# Patient Record
Sex: FEMALE | Race: WHITE | URBAN - NONMETROPOLITAN AREA
[De-identification: names, ages, dates, MRNs, and addresses within clinical notes are randomized per-mention and may not be internally consistent; named-entity substitution may affect disease eponyms.]

---

## 2020-06-02 ENCOUNTER — OFFICE VISIT (OUTPATIENT)
Dept: URBAN - NONMETROPOLITAN AREA CLINIC 15 | Facility: CLINIC | Age: 70
End: 2020-06-02

## 2020-06-23 ENCOUNTER — OFFICE VISIT (OUTPATIENT)
Dept: URBAN - METROPOLITAN AREA CLINIC 54 | Facility: CLINIC | Age: 70
End: 2020-06-23
Payer: MEDICARE

## 2020-06-23 DIAGNOSIS — D50.9 IDA (IRON DEFICIENCY ANEMIA): ICD-10-CM

## 2020-06-23 DIAGNOSIS — E83.119 HEMOCHROMATOSIS: ICD-10-CM

## 2020-06-23 DIAGNOSIS — E88.81 METABOLIC SYNDROME: ICD-10-CM

## 2020-06-23 DIAGNOSIS — D69.6 THROMBOCYTOPENIA: ICD-10-CM

## 2020-06-23 DIAGNOSIS — E83.10 CIRRHOSIS DUE TO HEMOCHROMATOSIS: ICD-10-CM

## 2020-06-23 DIAGNOSIS — I81 PORTAL VEIN THROMBOSIS: ICD-10-CM

## 2020-06-23 DIAGNOSIS — R74.8 ABNORMAL LIVER ENZYMES: ICD-10-CM

## 2020-06-23 PROCEDURE — G9903 PT SCRN TBCO ID AS NON USER: HCPCS | Performed by: INTERNAL MEDICINE

## 2020-06-23 PROCEDURE — G8427 DOCREV CUR MEDS BY ELIG CLIN: HCPCS | Performed by: INTERNAL MEDICINE

## 2020-06-23 PROCEDURE — 3017F COLORECTAL CA SCREEN DOC REV: CPT | Performed by: INTERNAL MEDICINE

## 2020-06-23 PROCEDURE — G8417 CALC BMI ABV UP PARAM F/U: HCPCS | Performed by: INTERNAL MEDICINE

## 2020-06-23 PROCEDURE — 74170 CT ABD WO CNTRST FLWD CNTRST: CPT | Performed by: INTERNAL MEDICINE

## 2020-06-23 PROCEDURE — 99214 OFFICE O/P EST MOD 30 MIN: CPT | Performed by: INTERNAL MEDICINE

## 2020-06-23 RX ORDER — TOLTERODINE TARTRATE 4 MG/1
TAKE 1 CAPSULE (4 MG) BY ORAL ROUTE ONCE DAILY CAPSULE, EXTENDED RELEASE ORAL 1
Qty: 0 | Refills: 0 | Status: ACTIVE | COMMUNITY
Start: 1900-01-01 | End: 1900-01-01

## 2020-06-23 RX ORDER — INSULIN DETEMIR 100 [IU]/ML
INJECT BY SUBCUTANEOUS ROUTE PER PRESCRIBER'S INSTRUCTIONS. INSULIN DOSING REQUIRES INDIVIDUALIZATION INJECTION, SOLUTION SUBCUTANEOUS
Qty: 1 | Refills: 0 | Status: ACTIVE | COMMUNITY
Start: 1900-01-01 | End: 1900-01-01

## 2020-06-23 RX ORDER — FUROSEMIDE 40 MG/1
1 TABLET TABLET ORAL BID
Qty: 60 | Refills: 2 | OUTPATIENT
Start: 2020-06-23

## 2020-06-23 RX ORDER — SPIRONOLACTONE 25 MG/1
TABLET ORAL
Qty: 0 | Refills: 0 | Status: ACTIVE | COMMUNITY
Start: 1900-01-01 | End: 1900-01-01

## 2020-06-23 RX ORDER — SPIRONOLACTONE 50 MG/1
1 TABLET TABLET, FILM COATED ORAL BID
Qty: 60 | Refills: 2 | OUTPATIENT
Start: 2020-06-23 | End: 2020-09-21

## 2020-06-23 NOTE — PHYSICAL EXAM GASTROINTESTINAL
Abdomen , soft, nontender, nondistended , no guarding or rigidity , no masses palpable , normal bowel sounds , Liver and Spleen , , liver nontender , spleen

## 2020-06-23 NOTE — HPI-TODAY'S VISIT:
Abnormal liver enzymes     790.5/R74.8   Secondary to OTTO now wiht cirrhosis and HFE hemachromatosis screen for varices neg 6/18 hcc monitoring program due dec 2018  2 gm sodium diet  MELD 13 6% 3 ms mortality   Metabolic syndrome     277.7/E88.81  Diabetes     250.00/E11.9  Thrombocytopenia     287.5/D69.6 related to cirrhosis  Cirrhosis     571.5/K74.60 on lactulose still not on rifaximin -MELD 13  pt with chronic portal vein thrombosis  Hemochromatosis     275.03/E83.119 on phlebotomy she is not interested in OLT  Portal vein thrombosis     452/I81 no e/o hcc started on Eliquis monitored by hematology cont current care  6-23-20 Pt returns for f/u visit. Pt found to have low na.  Pt hs been taking gatorade d/t pcp notifying of low sodium. Has not had phlebotomy d/t low BP. Not on eliquis despite PVT.  Pt reports that she sleeps upright. Lower ext edema and itching noted.

## 2020-06-24 LAB
A/G RATIO: 1
AFP, SERUM, TUMOR MARKER: 3.5
ALBUMIN: 3.3
ALKALINE PHOSPHATASE: 104
ALT (SGPT): 15
AST (SGOT): 37
BASO (ABSOLUTE): 0
BASOS: 1
BILIRUBIN, TOTAL: 4.3
BUN/CREATININE RATIO: 12
BUN: 9
CALCIUM: 9.2
CARBON DIOXIDE, TOTAL: 25
CHLORIDE: 99
CREATININE: 0.77
EGFR IF AFRICN AM: 90
EGFR IF NONAFRICN AM: 78
EOS (ABSOLUTE): 0.3
EOS: 6
GLOBULIN, TOTAL: 3.2
GLUCOSE: 204
HEMATOCRIT: 34.3
HEMATOLOGY COMMENTS:: (no result)
HEMOGLOBIN: 12.1
IMMATURE CELLS: (no result)
IMMATURE GRANS (ABS): 0
IMMATURE GRANULOCYTES: 0
INR: 1.2
LYMPHS (ABSOLUTE): 1.1
LYMPHS: 24
MCH: 34.9
MCHC: 35.3
MCV: 99
MONOCYTES(ABSOLUTE): 0.7
MONOCYTES: 16
NEUTROPHILS (ABSOLUTE): 2.4
NEUTROPHILS: 53
NRBC: (no result)
PLATELETS: 95
POTASSIUM: 3.9
PROTEIN, TOTAL: 6.5
PROTHROMBIN TIME: 12.7
RBC: 3.47
RDW: 14.3
SODIUM: 136
WBC: 4.5

## 2020-07-01 ENCOUNTER — TELEPHONE ENCOUNTER (OUTPATIENT)
Dept: URBAN - METROPOLITAN AREA CLINIC 92 | Facility: CLINIC | Age: 70
End: 2020-07-01

## 2020-07-01 ENCOUNTER — CLAIMS CREATED FROM THE CLAIM WINDOW (OUTPATIENT)
Dept: URBAN - METROPOLITAN AREA CLINIC 4 | Facility: CLINIC | Age: 70
End: 2020-07-01
Payer: MEDICARE

## 2020-07-01 ENCOUNTER — OFFICE VISIT (OUTPATIENT)
Dept: URBAN - METROPOLITAN AREA SURGERY CENTER 14 | Facility: SURGERY CENTER | Age: 70
End: 2020-07-01
Payer: MEDICARE

## 2020-07-01 DIAGNOSIS — K29.70 GASTRITIS, UNSPECIFIED, WITHOUT BLEEDING: ICD-10-CM

## 2020-07-01 DIAGNOSIS — K29.60 ADENOPAPILLOMATOSIS GASTRICA: ICD-10-CM

## 2020-07-01 DIAGNOSIS — K25.3 ACUTE BENIGN GASTRIC ULCER: ICD-10-CM

## 2020-07-01 PROCEDURE — 88312 SPECIAL STAINS GROUP 1: CPT | Performed by: PATHOLOGY

## 2020-07-01 PROCEDURE — 43239 EGD BIOPSY SINGLE/MULTIPLE: CPT | Performed by: INTERNAL MEDICINE

## 2020-07-01 PROCEDURE — G8907 PT DOC NO EVENTS ON DISCHARG: HCPCS | Performed by: INTERNAL MEDICINE

## 2020-07-01 PROCEDURE — 88305 TISSUE EXAM BY PATHOLOGIST: CPT | Performed by: PATHOLOGY

## 2020-07-01 RX ORDER — FUROSEMIDE 40 MG/1
1 TABLET TABLET ORAL BID
Qty: 60 | Refills: 2 | Status: ACTIVE | COMMUNITY
Start: 2020-06-23

## 2020-07-01 RX ORDER — PANTOPRAZOLE SODIUM 40 MG/1
1 TABLET TABLET, DELAYED RELEASE ORAL ONCE A DAY
Qty: 30 TABLET | Refills: 6 | OUTPATIENT
Start: 2020-07-01

## 2020-07-01 RX ORDER — SPIRONOLACTONE 25 MG/1
TABLET ORAL
OUTPATIENT
Start: 1900-01-01

## 2020-07-01 RX ORDER — TOLTERODINE TARTRATE 4 MG/1
TAKE 1 CAPSULE (4 MG) BY ORAL ROUTE ONCE DAILY CAPSULE, EXTENDED RELEASE ORAL 1
Qty: 0 | Refills: 0 | Status: ACTIVE | COMMUNITY
Start: 1900-01-01 | End: 1900-01-01

## 2020-07-01 RX ORDER — SPIRONOLACTONE 50 MG/1
1 TABLET TABLET, FILM COATED ORAL BID
Qty: 60 | Refills: 2 | Status: ACTIVE | COMMUNITY
Start: 2020-06-23 | End: 2020-09-21

## 2020-07-01 RX ORDER — INSULIN DETEMIR 100 [IU]/ML
INJECT BY SUBCUTANEOUS ROUTE PER PRESCRIBER'S INSTRUCTIONS. INSULIN DOSING REQUIRES INDIVIDUALIZATION INJECTION, SOLUTION SUBCUTANEOUS
Qty: 1 | Refills: 0 | Status: ACTIVE | COMMUNITY
Start: 1900-01-01 | End: 1900-01-01

## 2020-07-01 RX ORDER — SPIRONOLACTONE 25 MG/1
TABLET ORAL
Qty: 0 | Refills: 0 | Status: ACTIVE | COMMUNITY
Start: 1900-01-01 | End: 1900-01-01

## 2020-07-28 ENCOUNTER — TELEPHONE ENCOUNTER (OUTPATIENT)
Dept: URBAN - METROPOLITAN AREA CLINIC 54 | Facility: CLINIC | Age: 70
End: 2020-07-28

## 2021-08-17 ENCOUNTER — WEB ENCOUNTER (OUTPATIENT)
Dept: URBAN - NONMETROPOLITAN AREA CLINIC 4 | Facility: CLINIC | Age: 71
End: 2021-08-17

## 2021-08-17 ENCOUNTER — OFFICE VISIT (OUTPATIENT)
Dept: URBAN - NONMETROPOLITAN AREA CLINIC 4 | Facility: CLINIC | Age: 71
End: 2021-08-17
Payer: MEDICARE

## 2021-08-17 DIAGNOSIS — K75.81 NASH (NONALCOHOLIC STEATOHEPATITIS): ICD-10-CM

## 2021-08-17 DIAGNOSIS — K74.60 CIRRHOSIS: ICD-10-CM

## 2021-08-17 DIAGNOSIS — E83.119 HEMOCHROMATOSIS: ICD-10-CM

## 2021-08-17 DIAGNOSIS — D69.6 THROMBOCYTOPENIA: ICD-10-CM

## 2021-08-17 PROCEDURE — 99214 OFFICE O/P EST MOD 30 MIN: CPT | Performed by: INTERNAL MEDICINE

## 2021-08-17 PROCEDURE — 74177 CT ABD & PELVIS W/CONTRAST: CPT | Performed by: INTERNAL MEDICINE

## 2021-08-17 RX ORDER — TOLTERODINE TARTRATE 4 MG/1
TAKE 1 CAPSULE (4 MG) BY ORAL ROUTE ONCE DAILY CAPSULE, EXTENDED RELEASE ORAL 1
Qty: 0 | Refills: 0 | Status: DISCONTINUED | COMMUNITY

## 2021-08-17 RX ORDER — DONEPEZIL HYDROCHLORIDE 10 MG/1
1 TABLET AT BEDTIME TABLET, FILM COATED ORAL ONCE A DAY
Status: ACTIVE | COMMUNITY

## 2021-08-17 RX ORDER — INSULIN DETEMIR 100 [IU]/ML
INJECT BY SUBCUTANEOUS ROUTE PER PRESCRIBER'S INSTRUCTIONS. INSULIN DOSING REQUIRES INDIVIDUALIZATION INJECTION, SOLUTION SUBCUTANEOUS
Qty: 1 | Refills: 0 | Status: ACTIVE | COMMUNITY

## 2021-08-17 RX ORDER — LOVASTATIN 20 MG/1
1 TABLET WITH THE EVENING MEAL TABLET ORAL ONCE A DAY
Refills: 0 | Status: DISCONTINUED | COMMUNITY

## 2021-08-17 RX ORDER — PANTOPRAZOLE SODIUM 40 MG/1
1 TABLET TABLET, DELAYED RELEASE ORAL ONCE A DAY
Qty: 30 TABLET | Refills: 6 | Status: DISCONTINUED | COMMUNITY

## 2021-08-17 RX ORDER — FUROSEMIDE 40 MG/1
1 TABLET TABLET ORAL BID
Qty: 60 | Refills: 2 | Status: ACTIVE | COMMUNITY

## 2021-08-17 RX ORDER — TOLTERODINE TARTRATE 2 MG/1
1 TABLET TABLET, FILM COATED ORAL TWICE A DAY
Status: ACTIVE | COMMUNITY

## 2021-08-17 RX ORDER — CALCIUM CARBONATE 500(1250)
1 TABLET WITH MEALS TABLET ORAL TWICE A DAY
Status: ACTIVE | COMMUNITY

## 2021-08-19 LAB
A/G RATIO: 0.9
AFP, SERUM: 4.1
AFP-L3%, SERUM: 7.7
ALBUMIN: 3.1
ALKALINE PHOSPHATASE: 121
ALT (SGPT): 16
APTT: 31
AST (SGOT): 42
BASO (ABSOLUTE): 0
BASOS: 1
BILIRUBIN, TOTAL: 4.2
BUN/CREATININE RATIO: 8
BUN: 6
CALCIUM: 8.5
CARBON DIOXIDE, TOTAL: 24
CHLORIDE: 99
CREATININE: 0.71
EGFR IF AFRICN AM: 99
EGFR IF NONAFRICN AM: 86
EOS (ABSOLUTE): 0.2
EOS: 4
GLOBULIN, TOTAL: 3.4
GLUCOSE: 296
HEMATOCRIT: 32.7
HEMATOLOGY COMMENTS:: (no result)
HEMOGLOBIN: 10.4
IMMATURE CELLS: (no result)
IMMATURE GRANS (ABS): 0
IMMATURE GRANULOCYTES: 0
INR: 1.3
LYMPHS (ABSOLUTE): 0.9
LYMPHS: 21
MCH: 33.1
MCHC: 31.8
MCV: 104
MONOCYTES(ABSOLUTE): 0.6
MONOCYTES: 14
NEUTROPHILS (ABSOLUTE): 2.7
NEUTROPHILS: 60
NRBC: (no result)
PLATELETS: 133
POTASSIUM: 3.8
PROTEIN, TOTAL: 6.5
PROTHROMBIN TIME: 13.2
RBC: 3.14
RDW: 14.4
SODIUM: 138
WBC: 4.5

## 2021-10-08 ENCOUNTER — OFFICE VISIT (OUTPATIENT)
Dept: URBAN - METROPOLITAN AREA CLINIC 54 | Facility: CLINIC | Age: 71
End: 2021-10-08

## 2021-10-08 ENCOUNTER — DASHBOARD ENCOUNTERS (OUTPATIENT)
Age: 71
End: 2021-10-08

## 2021-10-08 ENCOUNTER — OFFICE VISIT (OUTPATIENT)
Dept: URBAN - METROPOLITAN AREA CLINIC 54 | Facility: CLINIC | Age: 71
End: 2021-10-08
Payer: MEDICARE

## 2021-10-08 DIAGNOSIS — D69.6 THROMBOCYTOPENIA: ICD-10-CM

## 2021-10-08 DIAGNOSIS — K75.81 NASH (NONALCOHOLIC STEATOHEPATITIS): ICD-10-CM

## 2021-10-08 DIAGNOSIS — E83.119 HEMOCHROMATOSIS: ICD-10-CM

## 2021-10-08 DIAGNOSIS — I81 PVT (PORTAL VEIN THROMBOSIS): ICD-10-CM

## 2021-10-08 DIAGNOSIS — K72.90 PORTOSYSTEMIC ENCEPHALOPATHY: ICD-10-CM

## 2021-10-08 DIAGNOSIS — K74.60 CIRRHOSIS: ICD-10-CM

## 2021-10-08 PROCEDURE — 99214 OFFICE O/P EST MOD 30 MIN: CPT | Performed by: INTERNAL MEDICINE

## 2021-10-08 RX ORDER — TOLTERODINE TARTRATE 2 MG/1
1 TABLET TABLET, FILM COATED ORAL TWICE A DAY
Status: ACTIVE | COMMUNITY

## 2021-10-08 RX ORDER — CALCIUM CARBONATE 500(1250)
1 TABLET WITH MEALS TABLET ORAL TWICE A DAY
Status: ACTIVE | COMMUNITY

## 2021-10-08 RX ORDER — LOVASTATIN 20 MG/1
1 TABLET WITH THE EVENING MEAL TABLET ORAL ONCE A DAY
Status: ACTIVE | COMMUNITY

## 2021-10-08 RX ORDER — OMEGA-3/DHA/EPA/FISH OIL 300-1000MG
AS DIRECTED CAPSULE ORAL
Status: ACTIVE | COMMUNITY

## 2021-10-08 RX ORDER — INSULIN DETEMIR 100 [IU]/ML
INJECT BY SUBCUTANEOUS ROUTE PER PRESCRIBER'S INSTRUCTIONS. INSULIN DOSING REQUIRES INDIVIDUALIZATION INJECTION, SOLUTION SUBCUTANEOUS
Qty: 1 | Refills: 0 | Status: ACTIVE | COMMUNITY

## 2021-10-08 RX ORDER — SPIRONOLACTONE 25 MG/1
1 TABLET TABLET, FILM COATED ORAL
Status: ACTIVE | COMMUNITY

## 2021-10-08 RX ORDER — DONEPEZIL HYDROCHLORIDE 10 MG/1
1 TABLET AT BEDTIME TABLET, FILM COATED ORAL ONCE A DAY
Status: ACTIVE | COMMUNITY

## 2021-10-08 RX ORDER — PROPRANOLOL HYDROCHLORIDE 10 MG/1
1/2 TABLET ORAL ONCE A DAY
Status: ACTIVE | COMMUNITY

## 2021-10-08 RX ORDER — FUROSEMIDE 40 MG/1
1 TABLET TABLET ORAL BID
Qty: 60 | Refills: 2 | Status: ACTIVE | COMMUNITY

## 2021-10-08 NOTE — PHYSICAL EXAM GASTROINTESTINAL
Abdomen , soft, nontender, nondistended , + abdominal fat, no ascites, no guarding or rigidity , no masses palpable , normal bowel sounds , Liver and Spleen , no hepatomegaly present , no hepatosplenomegaly , liver nontender , spleen not palpable

## 2021-10-08 NOTE — HPI-TODAY'S VISIT:
Abnormal liver enzymes     790.5/R74.8   Secondary to OTTO now wiht cirrhosis and HFE hemachromatosis screen for varices neg 6/18 hcc monitoring program due dec 2018  2 gm sodium diet  MELD 13 6% 3 ms mortality   Metabolic syndrome     277.7/E88.81  Diabetes     250.00/E11.9  Thrombocytopenia     287.5/D69.6 related to cirrhosis  Cirrhosis     571.5/K74.60 on lactulose still not on rifaximin -MELD 13  pt with chronic portal vein thrombosis  Hemochromatosis     275.03/E83.119 on phlebotomy she is not interested in OLT  Portal vein thrombosis     452/I81 no e/o hcc started on Eliquis monitored by hematology cont current care  6-23-20 Pt returns for f/u visit. Pt found to have low na.  Pt hs been taking gatorade d/t pcp notifying of low sodium. Has not had phlebotomy d/t low BP. Not on eliquis despite PVT.  Pt reports that she sleeps upright. Lower ext edema and itching noted. 8-17-21 Pt reports that she has had watery eyes. Pt reports that she was in the hospital in mid July with anasarca and edema. Pt was tarted back on lasix to help.  Pt reports that she was advised to sodium restrict.  Pt reports that her DM has been out of control 160+. Pt reports that there was no imaging performed in hospital except cxr and usg of lower extremity.  Pt remainson propanolol as well.  Pt with hemochromatosis as well on phlebotomy in March.  Follow Up 10/8/21: Patient presents for liver related opinion. She has NAFLD cirrhosis with portal hypertension manifested as volume overload and PSE. She is CTP B with MELD of 15. She recently developed volume overload managed at [facility] in [place], [place]. Her Lasix was increased to 40 QD and A25 added. She is on Lactulose 30 cc PRN and euvolemic. She has history of PVT previously on Eliquis. She has resonable QoL. She lives with her son.

## 2021-10-08 NOTE — PHYSICAL EXAM CONSTITUTIONAL:
Chronically ill appearing, in no acute distress , ambulating without difficulty , normal communication ability

## 2021-10-22 ENCOUNTER — OFFICE VISIT (OUTPATIENT)
Dept: URBAN - METROPOLITAN AREA CLINIC 54 | Facility: CLINIC | Age: 71
End: 2021-10-22

## 2022-03-02 NOTE — HPI-TODAY'S VISIT:
Abnormal liver enzymes     790.5/R74.8   Secondary to OTTO now wiht cirrhosis and HFE hemachromatosis screen for varices neg 6/18 hcc monitoring program due dec 2018  2 gm sodium diet  MELD 13 6% 3 ms mortality   Metabolic syndrome     277.7/E88.81  Diabetes     250.00/E11.9  Thrombocytopenia     287.5/D69.6 related to cirrhosis  Cirrhosis     571.5/K74.60 on lactulose still not on rifaximin -MELD 13  pt with chronic portal vein thrombosis  Hemochromatosis     275.03/E83.119 on phlebotomy she is not interested in OLT  Portal vein thrombosis     452/I81 no e/o hcc started on Eliquis monitored by hematology cont current care  6-23-20 Pt returns for f/u visit. Pt found to have low na.  Pt hs been taking gatorade d/t pcp notifying of low sodium. Has not had phlebotomy d/t low BP. Not on eliquis despite PVT.  Pt reports that she sleeps upright. Lower ext edema and itching noted. 8-17-21 Pt reports that she has had watery eyes. Pt reports that she was in the hospital in mid July with anasarca and edema. Pt was tarted back on lasix to help.  Pt reports that she was advised to sodium restrict.  Pt reports that her DM has been out of control 160+. Pt reports that there was no imaging performed in hospital except cxr and usg of lower extremity.  Pt remainson propanolol as well.  Pt with hemochromatosis as well on phlebotomy in March. Received request via: Pharmacy    Was the patient seen in the last year in this department? Yes    Does the patient have an active prescription (recently filled or refills available) for medication(s) requested? No     We need to send to [facility].